# Patient Record
Sex: FEMALE | URBAN - METROPOLITAN AREA
[De-identification: names, ages, dates, MRNs, and addresses within clinical notes are randomized per-mention and may not be internally consistent; named-entity substitution may affect disease eponyms.]

---

## 2022-11-03 ENCOUNTER — OFFICE VISIT (OUTPATIENT)
Dept: URBAN - METROPOLITAN AREA CLINIC 92 | Facility: CLINIC | Age: 52
End: 2022-11-03

## 2022-11-03 NOTE — HPI-TODAY'S VISIT:
52 year-old female who presents for a colon cancer screening.   Last colonoscopy 4/2017: one 7mm tubular adenoma in AC, one 6mm sessile serrated adenoma in distal AC, one 3 mm hypreplastic polyp in rectum, repeat in 3 years.  Last egd 4/2017: Normal  No family history of colon polyps or colon cancer.  Patient denies nausea, heartburn, dysphagia, abdominal pain, rectal bleeding, melena, unintentional weight loss, changes in bowel habits and loss of appetite. Patinet denies blood thinner use, pacemaker/defibrillator, diabetes, kidney disease, and home O2.

## 2022-12-02 ENCOUNTER — WEB ENCOUNTER (OUTPATIENT)
Dept: URBAN - METROPOLITAN AREA CLINIC 92 | Facility: CLINIC | Age: 52
End: 2022-12-02

## 2022-12-08 ENCOUNTER — OFFICE VISIT (OUTPATIENT)
Dept: URBAN - METROPOLITAN AREA CLINIC 92 | Facility: CLINIC | Age: 52
End: 2022-12-08

## 2022-12-08 NOTE — HPI-TODAY'S VISIT:
52 year-old female who presents for a colon cancer screening. Refered to us by Dr. Regine Ortiz a copy of this note will be sent to referring provider.   Colon 11/2016: diverticula in SC, diminutive polpy in Cecum, EH, repeat colon in 5 years.  No family history of colon polyps or colon cancer.  Patient denies nausea, heartburn, dysphagia, abdominal pain, rectal bleeding, melena, unintentional weight loss, changes in bowel habits and loss of appetite. Patinet denies blood thinner use, pacemaker/defibrillator, diabetes, kidney disease, and home O2.  Due to RUQ abd pain EGD 2/5/19: Small HH, single 2mm gastric polyp, chronic gastritis, normal duodenum Sunday

## 2022-12-08 NOTE — PHYSICAL EXAM CARDIOVASCULAR:
Caller: Tish Lockwood    Relationship: Self    Best call back number: [phone]    Medication needed:   Requested Prescriptions     Pending Prescriptions Disp Refills   • simvastatin (ZOCOR) 10 MG tablet 90 tablet 2     Sig: Take 1 tablet by mouth every night at bedtime.       When do you need the refill by: ASAP - PT HAS FOUR DAYS LEFT    What details did the patient provide when requesting the medication: PT HAS APPOINTMENT SCHEDULED FOR NEXT WEDNESDAY    Does the patient have less than a 3 day supply:  [] Yes  [x] No    What is the patient's preferred pharmacy: [facility] [address] [place] [address] [address] [facility] [address] [facility] [phone] [facility] [phone] FX                regular rate and rhythm